# Patient Record
Sex: FEMALE | Race: WHITE | NOT HISPANIC OR LATINO | Employment: STUDENT | URBAN - METROPOLITAN AREA
[De-identification: names, ages, dates, MRNs, and addresses within clinical notes are randomized per-mention and may not be internally consistent; named-entity substitution may affect disease eponyms.]

---
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
## 2024-03-21 ENCOUNTER — OFFICE VISIT (OUTPATIENT)
Dept: OBSTETRICS AND GYNECOLOGY | Facility: CLINIC | Age: 19
End: 2024-03-21
Payer: COMMERCIAL

## 2024-03-21 ENCOUNTER — HOSPITAL ENCOUNTER (OUTPATIENT)
Dept: RADIOLOGY | Facility: HOSPITAL | Age: 19
Discharge: HOME | End: 2024-03-21
Payer: COMMERCIAL

## 2024-03-21 VITALS
DIASTOLIC BLOOD PRESSURE: 70 MMHG | WEIGHT: 203.9 LBS | SYSTOLIC BLOOD PRESSURE: 110 MMHG | BODY MASS INDEX: 32 KG/M2 | HEIGHT: 67 IN

## 2024-03-21 DIAGNOSIS — Z11.3 SCREEN FOR STD (SEXUALLY TRANSMITTED DISEASE): ICD-10-CM

## 2024-03-21 DIAGNOSIS — R10.2 PELVIC PAIN: ICD-10-CM

## 2024-03-21 DIAGNOSIS — N93.9 ABNORMAL UTERINE BLEEDING (AUB): ICD-10-CM

## 2024-03-21 DIAGNOSIS — Z97.5 IUD (INTRAUTERINE DEVICE) IN PLACE: Primary | ICD-10-CM

## 2024-03-21 LAB — PREGNANCY TEST URINE, POC: NEGATIVE

## 2024-03-21 PROCEDURE — 76830 TRANSVAGINAL US NON-OB: CPT | Performed by: RADIOLOGY

## 2024-03-21 PROCEDURE — 81025 URINE PREGNANCY TEST: CPT | Performed by: OBSTETRICS & GYNECOLOGY

## 2024-03-21 PROCEDURE — 76856 US EXAM PELVIC COMPLETE: CPT | Performed by: RADIOLOGY

## 2024-03-21 PROCEDURE — 87800 DETECT AGNT MULT DNA DIREC: CPT

## 2024-03-21 PROCEDURE — 87661 TRICHOMONAS VAGINALIS AMPLIF: CPT

## 2024-03-21 PROCEDURE — 99204 OFFICE O/P NEW MOD 45 MIN: CPT | Performed by: OBSTETRICS & GYNECOLOGY

## 2024-03-21 PROCEDURE — 76856 US EXAM PELVIC COMPLETE: CPT

## 2024-03-21 RX ORDER — LEVONORGESTREL 52 MG/1
1 INTRAUTERINE DEVICE INTRAUTERINE ONCE
COMMUNITY

## 2024-03-21 NOTE — PROGRESS NOTES
"GYNECOLOGY PROGRESS NOTE          CC:     Chief Complaint   Patient presents with    Vaginal Bleeding     New patient - here today because she had an IUD inserted 3 yrs ago and in the last month or two she was passing clots randomly.  Was also having cramping with it.  Chaperone mana tomas ma       HPI:  Gabrielle Pendleton is here with new complaint of AUB with Mirena IUD.    Patient is premenopausal.  She has been having some new onset irregular bleeding with clot passage in the last 1-2 months, she notices this mainly with using the restroom.  She is not having regular cycles with this bleeding and had previously been amenorrheic with use of her Mirena IUD after the initial post-insertion bleeding stopped.  No associated pain or vaginal discharge with the bleeding.  Bleeding is vaginal, not GI or urologic in origin.  She is sexually active, she has not had any STD screening since her IUD was placed that she is aware of, last STD screening was negative 7/2021.        ROS:  URO - no hematuria  GI - no blood in BMs  GYN - see HPI         HISTORY:  Past Surgical History:   Procedure Laterality Date    BREAST SURGERY      breast reduction    OTHER SURGICAL HISTORY  05/13/2021    No history of surgery     Social History     Tobacco Use    Smoking status: Never    Smokeless tobacco: Never     Cancer-related family history is not on file.     LAB RESULTS:  7/20/21 - GC/Chl/trich negative      PHYSICAL EXAM:  /70 (BP Location: Left arm, Patient Position: Sitting)   Ht 1.702 m (5' 7\")   Wt 92.5 kg (203 lb 14.4 oz)   BMI 31.94 kg/m²   GEN:  A&O, NAD  HEENT:  head HC/AT, no visible goiter  ABD:  NT/ND, soft, no palpable masses  URO:  normal urethral meatus, no bladder TTP  GYN:  normal vulva and perineum w/o lesions or ulcers, normal vaginal rugae without discharge or lesions--scant dark old blood in vaginal vault, normal cervix without lesions or discharge or CMT (IUD strings=2.5 cm long)--no active bleeding from os, uterus " NT/NE, adnexa mobile and NT/NE  PSYCH:  normal affect, non-anxious      IMPRESSION/PLAN:    Problem List Items Addressed This Visit    None    Problem List Items Addressed This Visit    None  Visit Diagnoses       IUD (intrauterine device) in place    -  Primary    Pelvic pain        Screen for STD (sexually transmitted disease)        Relevant Orders    Trichomonas vaginalis, Nucleic Acid Detection (Completed)    C. Trachomatis / N. Gonorrhoeae, Amplified Detection (Completed)    Abnormal uterine bleeding (AUB)        Relevant Orders    POCT pregnancy, urine manually resulted (Completed)    US PELVIS TRANSABDOMINAL WITH TRANSVAGINAL (Completed)            AUB:  Discussed with the patient the potential etiologies  (PALMCOEIN) for her AUB.  Suspected diagnosis is AUB-I related to IUD (?return of IUD-related AUB vs displaced IUD vs IUD wing into myometrium).  Urine HCG negative today.  Given sexually activity and lack of routine STD screening since 2021 STD cultures recommended to rule out a cervicitis as the etiology for her bleeding--testing accepted by patient, recommend yearly screening until age 25 per CDC guidelines.  Pelvic ultrasound ordered.  Treatment plan to follow once workup completed.    Will need pap starting in 2025 at age 20.      Office will call with US and culture results.        Jared Stevens DO

## 2024-03-22 LAB
C TRACH RRNA SPEC QL NAA+PROBE: NEGATIVE
N GONORRHOEA DNA SPEC QL PROBE+SIG AMP: NEGATIVE
T VAGINALIS RRNA SPEC QL NAA+PROBE: NEGATIVE

## 2024-03-25 ENCOUNTER — TELEPHONE (OUTPATIENT)
Dept: OBSTETRICS AND GYNECOLOGY | Facility: CLINIC | Age: 19
End: 2024-03-25
Payer: COMMERCIAL

## 2024-03-25 NOTE — TELEPHONE ENCOUNTER
----- Message from Jared Stevens DO sent at 3/23/2024  2:38 PM EDT -----  Call patient please:   normal US (IUD in place, normal uterus/endometrium/ovaries) and STD cultures are negative.  At this point we can try a couple of medications to help with the bleeding that stabilize the endometrial lining to help stop her bleeding.  1st is estrogen pill course x 2 weeks, 2nd is an antibiotic x 2 weeks.  If OK with this then send Rx for estradiol 1mg daily x 14 days.

## 2024-03-26 NOTE — TELEPHONE ENCOUNTER
Spoke with patient and made her aware of the results and doctor recommendation.  Patient states she is not currently bleeding but sometimes it comes in waves.  So I advised her that if she wants to hold off we can and if she starts bleeding again to call the office and ask for me and we will send the medication.  Reviewed and approved by ARTEM LOVELACE on 3/26/24 at 3:18 PM.

## 2024-07-19 ENCOUNTER — OFFICE VISIT (OUTPATIENT)
Dept: OBSTETRICS AND GYNECOLOGY | Facility: CLINIC | Age: 19
End: 2024-07-19
Payer: COMMERCIAL

## 2024-07-19 VITALS
BODY MASS INDEX: 32.33 KG/M2 | HEIGHT: 67 IN | WEIGHT: 206 LBS | DIASTOLIC BLOOD PRESSURE: 78 MMHG | SYSTOLIC BLOOD PRESSURE: 124 MMHG

## 2024-07-19 DIAGNOSIS — N89.8 VAGINAL ODOR: Primary | ICD-10-CM

## 2024-07-19 PROCEDURE — 1036F TOBACCO NON-USER: CPT | Performed by: OBSTETRICS & GYNECOLOGY

## 2024-07-19 PROCEDURE — 87205 SMEAR GRAM STAIN: CPT

## 2024-07-19 PROCEDURE — 99213 OFFICE O/P EST LOW 20 MIN: CPT | Performed by: OBSTETRICS & GYNECOLOGY

## 2024-07-19 PROCEDURE — 3008F BODY MASS INDEX DOCD: CPT | Performed by: OBSTETRICS & GYNECOLOGY

## 2024-07-19 ASSESSMENT — PAIN SCALES - GENERAL: PAINLEVEL: 0-NO PAIN

## 2024-07-19 NOTE — PROGRESS NOTES
"Assessment     PLAN  1. Vaginal odor  - no clear etiology on exam, treat based on results  - reviewed vaginal hygiene recs  - Vaginitis Gram Stain For Bacterial Vaginosis + Yeast    Please return for your next visit annual exam or sooner PRN    Rika Martinez MD      Subjective     Gabrielle Pendleton \"Mi\" is a 19 y.o. female who is here for vaginal odor  PCP = Concepcion Islas,     Concerns:   - vaginal odor for 6 months  - tried boric acid suppository, probiotics without improvement  - slight increase in vaginal discharge  - no itching/irritation    Gynecologic History:    OBHx: G0  Menses: amenorrheic  Last Pap: Never  Sexually active: active with one partner  STI testing: declines, recently negative  Contraception: mirena IUD inserted 7/2021    PMH, PSH, FH, SH, medications and allergies reviewed and edited as needed.      Objective   /78   Ht 1.702 m (5' 7\")   Wt 93.4 kg (206 lb)   BMI 32.26 kg/m²      General:   Alert and oriented, in no acute distress   Neck:    Breast/Axilla:    Abdomen: Soft, non-tender, without masses or organomegaly   Vulva: Normal architecture without erythema, masses, or lesions.    Vagina: Normal mucosa without lesions, masses, or atrophy. Small amount of creamy white/yellow discharge   Cervix: Normal without masses, lesions, or signs of cervicitis.    Uterus:    Adnexa:    Pelvic Floor    Psych Normal affect. Normal mood.        "

## 2024-07-21 LAB
CLUE CELLS VAG LPF-#/AREA: NORMAL /[LPF]
NUGENT SCORE: 0
YEAST VAG WET PREP-#/AREA: NORMAL